# Patient Record
Sex: FEMALE | Race: WHITE | NOT HISPANIC OR LATINO | Employment: OTHER | URBAN - NONMETROPOLITAN AREA
[De-identification: names, ages, dates, MRNs, and addresses within clinical notes are randomized per-mention and may not be internally consistent; named-entity substitution may affect disease eponyms.]

---

## 2017-06-13 ENCOUNTER — TRANSCRIBE ORDERS (OUTPATIENT)
Dept: GENERAL RADIOLOGY | Facility: CLINIC | Age: 57
End: 2017-06-13

## 2017-06-13 DIAGNOSIS — L40.50 PSORIATIC ARTHROPATHY (HCC): Primary | ICD-10-CM

## 2017-06-13 DIAGNOSIS — Z79.899 ENCOUNTER FOR LONG-TERM (CURRENT) USE OF MEDICATIONS: ICD-10-CM

## 2019-03-08 RX ORDER — CITALOPRAM 10 MG/1
10 TABLET ORAL DAILY
COMMUNITY

## 2019-03-08 RX ORDER — SUMATRIPTAN 50 MG/1
50 TABLET, FILM COATED ORAL
COMMUNITY
Start: 2016-07-14

## 2019-03-08 RX ORDER — HYDROCODONE BITARTRATE AND ACETAMINOPHEN 5; 325 MG/1; MG/1
1 TABLET ORAL 2 TIMES DAILY PRN
COMMUNITY
Start: 2017-05-08

## 2019-03-11 ENCOUNTER — ANESTHESIA (OUTPATIENT)
Dept: GASTROENTEROLOGY | Facility: HOSPITAL | Age: 59
End: 2019-03-11

## 2019-03-11 ENCOUNTER — HOSPITAL ENCOUNTER (OUTPATIENT)
Facility: HOSPITAL | Age: 59
Setting detail: HOSPITAL OUTPATIENT SURGERY
Discharge: HOME | End: 2019-03-11
Attending: INTERNAL MEDICINE | Admitting: INTERNAL MEDICINE

## 2019-03-11 ENCOUNTER — ANESTHESIA EVENT (OUTPATIENT)
Dept: GASTROENTEROLOGY | Facility: HOSPITAL | Age: 59
End: 2019-03-11

## 2019-03-11 VITALS
WEIGHT: 110.01 LBS | SYSTOLIC BLOOD PRESSURE: 103 MMHG | HEIGHT: 64 IN | RESPIRATION RATE: 19 BRPM | DIASTOLIC BLOOD PRESSURE: 59 MMHG | BODY MASS INDEX: 18.78 KG/M2 | HEART RATE: 62 BPM | TEMPERATURE: 97.4 F | OXYGEN SATURATION: 96 %

## 2019-03-11 DIAGNOSIS — R19.7 DIARRHEA OF PRESUMED INFECTIOUS ORIGIN: ICD-10-CM

## 2019-03-11 PROCEDURE — 88305 TISSUE EXAM BY PATHOLOGIST: CPT | Performed by: INTERNAL MEDICINE

## 2019-03-11 PROCEDURE — 88313 SPECIAL STAINS GROUP 2: CPT | Performed by: PATHOLOGY

## 2019-03-11 PROCEDURE — 25010000002 PROPOFOL 10 MG/ML EMULSION: Performed by: NURSE ANESTHETIST, CERTIFIED REGISTERED

## 2019-03-11 PROCEDURE — 88313 SPECIAL STAINS GROUP 2: CPT | Performed by: INTERNAL MEDICINE

## 2019-03-11 PROCEDURE — 88305 TISSUE EXAM BY PATHOLOGIST: CPT | Performed by: PATHOLOGY

## 2019-03-11 RX ORDER — LIDOCAINE HYDROCHLORIDE 10 MG/ML
INJECTION, SOLUTION INFILTRATION; PERINEURAL AS NEEDED
Status: DISCONTINUED | OUTPATIENT
Start: 2019-03-11 | End: 2019-03-11

## 2019-03-11 RX ORDER — PROPOFOL 10 MG/ML
VIAL (ML) INTRAVENOUS AS NEEDED
Status: DISCONTINUED | OUTPATIENT
Start: 2019-03-11 | End: 2019-03-11 | Stop reason: SURG

## 2019-03-11 RX ORDER — DEXTROSE AND SODIUM CHLORIDE 5; .45 G/100ML; G/100ML
20 INJECTION, SOLUTION INTRAVENOUS CONTINUOUS
Status: DISCONTINUED | OUTPATIENT
Start: 2019-03-11 | End: 2019-03-11 | Stop reason: HOSPADM

## 2019-03-11 RX ORDER — LIDOCAINE HYDROCHLORIDE 10 MG/ML
INJECTION, SOLUTION INFILTRATION; PERINEURAL AS NEEDED
Status: DISCONTINUED | OUTPATIENT
Start: 2019-03-11 | End: 2019-03-11 | Stop reason: SURG

## 2019-03-11 RX ORDER — PROPOFOL 10 MG/ML
VIAL (ML) INTRAVENOUS AS NEEDED
Status: DISCONTINUED | OUTPATIENT
Start: 2019-03-11 | End: 2019-03-11

## 2019-03-11 RX ADMIN — LIDOCAINE HYDROCHLORIDE 40 MG: 10 INJECTION, SOLUTION INFILTRATION; PERINEURAL at 13:54

## 2019-03-11 RX ADMIN — PROPOFOL 40 MG: 10 INJECTION, EMULSION INTRAVENOUS at 13:57

## 2019-03-11 RX ADMIN — PROPOFOL 30 MG: 10 INJECTION, EMULSION INTRAVENOUS at 14:00

## 2019-03-11 RX ADMIN — PROPOFOL 100 MG: 10 INJECTION, EMULSION INTRAVENOUS at 13:54

## 2019-03-11 RX ADMIN — DEXTROSE AND SODIUM CHLORIDE 20 ML/HR: 5; 450 INJECTION, SOLUTION INTRAVENOUS at 13:28

## 2019-03-11 NOTE — ANESTHESIA POSTPROCEDURE EVALUATION
Patient: Mellisa Jefferson    Procedure Summary     Date:  03/11/19 Room / Location:  [facility] ENDOSCOPY 2 / [facility] ENDOSCOPY    Anesthesia Start:  1343 Anesthesia Stop:  1410    Procedure:  COLONOSCOPY (N/A ) Diagnosis:       Diarrhea of presumed infectious origin      (Diarrhea of presumed infectious origin [R19.7])    Surgeon:  Junior Kiran DO Provider:  Beth Leggett CRNA    Anesthesia Type:  MAC ASA Status:  2          Anesthesia Type: MAC  Last vitals  BP   132/62 (03/11/19 1302)   Temp   98.1 °F (36.7 °C) (03/11/19 1302)   Pulse   73 (03/11/19 1302)   Resp   16 (03/11/19 1302)     SpO2   98 % (03/11/19 1302)     Post Anesthesia Care and Evaluation    Patient location during evaluation: bedside  Patient participation: waiting for patient participation  Level of consciousness: sleepy but conscious  Pain score: 0  Pain management: adequate  Airway patency: patent  Anesthetic complications: No anesthetic complications  PONV Status: none  Cardiovascular status: acceptable  Respiratory status: acceptable  Hydration status: acceptable

## 2019-03-11 NOTE — H&P
Sahr Mathew DO,[facility]  Gastroenterology  Hepatology  Endoscopy  Board Certified in Internal Medicine and gastroenterology  [address], [address]  [place]. [address]  - [phone]   F [phone]     GASTROENTEROLOGY HISTORY AND PHYSICAL  NOTE   SHAR MATHEW DO.         SUBJECTIVE:   3/11/2019    Name: Mellisa Jefferson  DOD: 1960      Chief Complaint:     Subjective : Diarrhea with changes in bowel habits    Patient is 58 y.o. female presents with desire for colonoscopy with biopsies.      ROS/HISTORY/ CURRENT MEDICATIONS/OBJECTIVE/VS/PE:   Review of Systems:  All systems unremarkable unless specified below.  Constitutional   HENT  Eyes   Respiratory    Cardiovascular  Gastrointestinal   Endocrine  Genitourinary    Musculoskeletal   Skin  Allergic/Immunologic    Neurological    Hematological  Psychiatric/Behavioral    History:     Past Medical History:   Diagnosis Date   • Anxiety    • Psoriatic arthritis (CMS/HCC)      Past Surgical History:   Procedure Laterality Date   • HYSTERECTOMY       History reviewed. No pertinent family history.  Social History     Tobacco Use   • Smoking status: Former Smoker     Last attempt to quit: 2008     Years since quittin.1   • Smokeless tobacco: Never Used   Substance Use Topics   • Alcohol use: Yes     Alcohol/week: 0.6 oz     Types: 1 Glasses of wine per week   • Drug use: No     Medications Prior to Admission   Medication Sig Dispense Refill Last Dose   • Calcium Carbonate-Vitamin D 500-50 MG-UNIT capsule Take 1 tablet by mouth Daily.   3/11/2019 at Unknown time   • Certolizumab Pegol 2 X 200 MG kit Inject 200 mg under the skin into the appropriate area as directed Every 14 (Fourteen) Days.   3/10/2019 at Unknown time   • citalopram (CeleXA) 10 MG tablet Take 10 mg by mouth Daily.   3/11/2019 at Unknown time   • estrogens, conjugated, (PREMARIN) 0.625 MG tablet Take 1 tablet by mouth Daily.   3/11/2019 at Unknown time   •  HYDROcodone-acetaminophen (NORCO) 5-325 MG per tablet Take 1 tablet by mouth 2 (Two) Times a Day As Needed.   3/11/2019 at Unknown time   • SUMAtriptan (IMITREX) 50 MG tablet Take 50 mg by mouth Every 2 (Two) Hours.   3/10/2019 at Unknown time   • ASPIRIN 81 PO Take 81 mg by mouth Daily.   More than a month at Unknown time     Allergies:  Etodolac; Sulfamethoxazole-trimethoprim; Tramadol hcl; and Hydroxychloroquine    I have reviewed the patients medical history, surgical history and family history in the available medical record system.     Current Medications:     Current Facility-Administered Medications   Medication Dose Route Frequency Provider Last Rate Last Dose   • dextrose 5 % and sodium chloride 0.45 % infusion  20 mL/hr Intravenous Continuous Junior Kiran DO 20 mL/hr at 03/11/19 1328 20 mL/hr at 03/11/19 1328       Objective     Physical Exam:   Temp:  [98.1 °F (36.7 °C)] 98.1 °F (36.7 °C)  Heart Rate:  [73] 73  Resp:  [16] 16  BP: (132)/(62) 132/62    Physical Exam:  General Appearance:    Alert, cooperative, in no acute distress   Head:    Normocephalic, without obvious abnormality, atraumatic   Eyes:            Lids and lashes normal, conjunctivae and sclerae normal, no   icterus, no pallor, corneas clear, PERRLA   Ears:    Ears appear intact with no abnormalities noted   Throat:   No oral lesions, no thrush, oral mucosa moist   Neck:   No adenopathy, supple, trachea midline, no thyromegaly, no     carotid bruit, no JVD   Back:     No kyphosis present, no scoliosis present, no skin lesions,       erythema or scars, no tenderness to percussion or                   palpation,   range of motion normal   Lungs:     Clear to auscultation,respirations regular, even and                   unlabored    Heart:    Regular rhythm and normal rate, normal S1 and S2, no            murmur, no gallop, no rub, no click   Breast Exam:    Deferred   Abdomen:     Normal bowel sounds, no masses, no organomegaly, soft         non-tender, non-distended, no guarding, no rebound                 tenderness   Genitalia:    Deferred   Extremities:   Moves all extremities well, no edema, no cyanosis, no              redness   Pulses:   Pulses palpable and equal bilaterally   Skin:   No bleeding, bruising or rash   Lymph nodes:   No palpable adenopathy   Neurologic:   Cranial nerves 2 - 12 grossly intact, sensation intact, DTR        present and equal bilaterally      Results Review:     No results found for: WBC, HGB, HCT, PLT  Results from last 7 days   Lab Units 03/04/19  1552   AST (SGOT) U/L 21         No results found for: LIPASE  No results found for: INR       Radiology Review:  Imaging Results (last 72 hours)     ** No results found for the last 72 hours. **           I reviewed the patient's new clinical results.  I reviewed the patient's new imaging results and agree with the interpretation.     ASSESSMENT/PLAN:   ASSESSMENT:  1.  Changes in bowel habits  2.  Diarrhea, functional    PLAN:  1.  Colonoscopy with biopsies    Risk and benefits associated with the procedure are reviewed with the patient.  The patient wished to proceed     Junior Kiran DO  03/11/19  1:43 PM

## 2019-03-11 NOTE — ANESTHESIA PREPROCEDURE EVALUATION
Anesthesia Evaluation     Patient summary reviewed and Nursing notes reviewed   NPO Solid Status: > 8 hours  NPO Liquid Status: > 4 hours           Airway   Mallampati: II  TM distance: >3 FB  Neck ROM: full  No difficulty expected  Dental - normal exam     Pulmonary - negative pulmonary ROS and normal exam   Cardiovascular - negative cardio ROS and normal exam        Neuro/Psych  (+) headaches, psychiatric history Anxiety and Depression,     GI/Hepatic/Renal/Endo - negative ROS     Musculoskeletal (-) negative ROS    Abdominal  - normal exam   Substance History - negative use     OB/GYN negative ob/gyn ROS         Other                      Anesthesia Plan    ASA 2     MAC     intravenous induction   Anesthetic plan, all risks, benefits, and alternatives have been provided, discussed and informed consent has been obtained with: patient.

## 2019-03-12 LAB
CYTO UR: NORMAL
LAB AP CASE REPORT: NORMAL
LAB AP SPECIAL STAINS: NORMAL
PATH REPORT.FINAL DX SPEC: NORMAL
PATH REPORT.GROSS SPEC: NORMAL

## 2021-10-15 ENCOUNTER — HOSPITAL ENCOUNTER (EMERGENCY)
Facility: HOSPITAL | Age: 61
Discharge: HOME | End: 2021-10-15
Attending: FAMILY MEDICINE | Admitting: FAMILY MEDICINE

## 2021-10-15 VITALS
SYSTOLIC BLOOD PRESSURE: 134 MMHG | OXYGEN SATURATION: 96 % | DIASTOLIC BLOOD PRESSURE: 72 MMHG | TEMPERATURE: 98.2 F | RESPIRATION RATE: 18 BRPM | HEART RATE: 58 BPM

## 2021-10-15 PROCEDURE — 25010000002 INJECTION, BAMLANIVIMAB AND ETESEVIMAB, 2100 MG: Performed by: FAMILY MEDICINE

## 2021-10-15 PROCEDURE — M0245 HC IV INFUSION, BAMLANIVIMAB AND ETESEVIMAB, 2100 MG: HCPCS | Performed by: FAMILY MEDICINE

## 2021-10-15 PROCEDURE — 99202 OFFICE O/P NEW SF 15 MIN: CPT

## 2021-10-15 RX ORDER — EPINEPHRINE 1 MG/ML
0.3 INJECTION, SOLUTION INTRAMUSCULAR; SUBCUTANEOUS ONCE AS NEEDED
Status: DISCONTINUED | OUTPATIENT
Start: 2021-10-15 | End: 2021-10-15 | Stop reason: HOSPADM

## 2021-10-15 RX ORDER — DIPHENHYDRAMINE HYDROCHLORIDE 50 MG/ML
50 INJECTION INTRAMUSCULAR; INTRAVENOUS ONCE AS NEEDED
Status: DISCONTINUED | OUTPATIENT
Start: 2021-10-15 | End: 2021-10-15 | Stop reason: HOSPADM

## 2021-10-15 RX ORDER — SODIUM CHLORIDE 9 MG/ML
30 INJECTION, SOLUTION INTRAVENOUS ONCE
Status: COMPLETED | OUTPATIENT
Start: 2021-10-15 | End: 2021-10-15

## 2021-10-15 RX ORDER — METHYLPREDNISOLONE SODIUM SUCCINATE 125 MG/2ML
125 INJECTION, POWDER, LYOPHILIZED, FOR SOLUTION INTRAMUSCULAR; INTRAVENOUS ONCE AS NEEDED
Status: DISCONTINUED | OUTPATIENT
Start: 2021-10-15 | End: 2021-10-15 | Stop reason: HOSPADM

## 2021-10-15 RX ADMIN — SODIUM CHLORIDE: 9 INJECTION, SOLUTION INTRAVENOUS at 12:16

## 2021-10-15 RX ADMIN — SODIUM CHLORIDE 30 ML: 9 INJECTION, SOLUTION INTRAVENOUS at 12:40

## (undated) DEVICE — CANN SMPL SOFTECH BIFLO ETCO2 A/M 7FT

## (undated) DEVICE — SINGLE-USE BIOPSY FORCEPS: Brand: RADIAL JAW 4